# Patient Record
Sex: FEMALE | HISPANIC OR LATINO | URBAN - METROPOLITAN AREA
[De-identification: names, ages, dates, MRNs, and addresses within clinical notes are randomized per-mention and may not be internally consistent; named-entity substitution may affect disease eponyms.]

---

## 2022-12-12 ENCOUNTER — OFFICE (OUTPATIENT)
Dept: URBAN - METROPOLITAN AREA CLINIC 112 | Facility: CLINIC | Age: 57
Setting detail: OPHTHALMOLOGY
End: 2022-12-12
Payer: COMMERCIAL

## 2022-12-12 DIAGNOSIS — H40.1121: ICD-10-CM

## 2022-12-12 DIAGNOSIS — H04.122: ICD-10-CM

## 2022-12-12 DIAGNOSIS — H25.11: ICD-10-CM

## 2022-12-12 DIAGNOSIS — H04.121: ICD-10-CM

## 2022-12-12 DIAGNOSIS — H25.12: ICD-10-CM

## 2022-12-12 DIAGNOSIS — H04.123: ICD-10-CM

## 2022-12-12 DIAGNOSIS — H40.1111: ICD-10-CM

## 2022-12-12 PROCEDURE — 92083 EXTENDED VISUAL FIELD XM: CPT | Performed by: OPHTHALMOLOGY

## 2022-12-12 PROCEDURE — 92014 COMPRE OPH EXAM EST PT 1/>: CPT | Performed by: OPHTHALMOLOGY

## 2022-12-12 ASSESSMENT — KERATOMETRY
OD_K2POWER_DIOPTERS: 42.25
OD_AXISANGLE_DEGREES: 023
METHOD_AUTO_MANUAL: AUTO
OD_K1POWER_DIOPTERS: 41.75
OS_K1POWER_DIOPTERS: 42.00
OS_K2POWER_DIOPTERS: 42.25
OS_AXISANGLE_DEGREES: 168

## 2022-12-12 ASSESSMENT — REFRACTION_MANIFEST
OD_VA1: 20/20
OS_SPHERE: +4.25
OS_SPHERE: +2.50
OD_VA2: 20/20
OU_VA: 20/20
OS_VA2: 20/20
OD_AXIS: 0
OS_VA1: 20/20
OD_CYLINDER: PLANO
OS_ADD: +1.75
OD_AXIS: 123
OS_CYLINDER: PLANO
OS_AXIS: 107
OD_VA2: 20/20
OD_SPHERE: +2.50
OS_ADD: +1.75
OS_VA2: 20/20
OS_AXIS: 0
OD_ADD: +1.75
OD_ADD: +1.75
OS_CYLINDER: -0.25
OD_VA1: 20/25
OD_SPHERE: +4.00
OS_VA1: 20/25
OD_CYLINDER: -0.50

## 2022-12-12 ASSESSMENT — CONFRONTATIONAL VISUAL FIELD TEST (CVF)
OD_FINDINGS: FULL
OS_FINDINGS: FULL

## 2022-12-12 ASSESSMENT — REFRACTION_CURRENTRX
OS_VPRISM_DIRECTION: PROGS
OS_ADD: +1.75
OD_VPRISM_DIRECTION: PROGS
OS_ADD: +2.00
OS_CYLINDER: -0.75
OS_SPHERE: +4.50
OD_AXIS: 113
OS_AXIS: 085
OD_ADD: +2.00
OS_VPRISM_DIRECTION: PROGS
OS_AXIS: 090
OS_OVR_VA: 20/
OS_AXIS: 111
OD_OVR_VA: 20/
OD_AXIS: 103
OD_SPHERE: +3.00
OS_VPRISM_DIRECTION: PROGS
OS_SPHERE: +3.25
OD_AXIS: 121
OD_VPRISM_DIRECTION: PROGS
OD_CYLINDER: -0.50
OD_CYLINDER: -0.50
OD_ADD: +1.75
OS_CYLINDER: -0.25
OS_SPHERE: +4.50
OD_OVR_VA: 20/
OS_CYLINDER: -0.25
OD_CYLINDER: -0.25
OD_ADD: +1.75
OD_VPRISM_DIRECTION: PROGS
OD_SPHERE: +4.25
OS_ADD: +1.75
OD_SPHERE: +4.25
OD_OVR_VA: 20/

## 2022-12-12 ASSESSMENT — REFRACTION_AUTOREFRACTION
OD_SPHERE: +4.00
OS_AXIS: 088
OD_AXIS: 116
OD_CYLINDER: -0.50
OS_SPHERE: +4.75
OS_CYLINDER: -0.50

## 2022-12-12 ASSESSMENT — TONOMETRY
OS_IOP_MMHG: 18
OD_IOP_MMHG: 18

## 2022-12-12 ASSESSMENT — SPHEQUIV_DERIVED
OD_SPHEQUIV: 3.75
OS_SPHEQUIV: 4.125
OS_SPHEQUIV: 4.5
OD_SPHEQUIV: 3.75

## 2022-12-12 ASSESSMENT — AXIALLENGTH_DERIVED
OS_AL: 22.4065
OD_AL: 22.7157
OS_AL: 22.539
OD_AL: 22.7157

## 2022-12-12 ASSESSMENT — VISUAL ACUITY
OS_BCVA: 20/20
OD_BCVA: 20/20-

## 2022-12-12 ASSESSMENT — SUPERFICIAL PUNCTATE KERATITIS (SPK)
OD_SPK: 1+
OS_SPK: 1+

## 2022-12-12 ASSESSMENT — PACHYMETRY
OS_CT_UM: 613
OD_CT_UM: 635
OD_CT_CORRECTION: -6
OS_CT_CORRECTION: -5

## 2023-05-31 ENCOUNTER — OFFICE (OUTPATIENT)
Dept: URBAN - METROPOLITAN AREA CLINIC 94 | Facility: CLINIC | Age: 58
Setting detail: OPHTHALMOLOGY
End: 2023-05-31
Payer: COMMERCIAL

## 2023-05-31 DIAGNOSIS — H40.1111: ICD-10-CM

## 2023-05-31 DIAGNOSIS — H25.11: ICD-10-CM

## 2023-05-31 DIAGNOSIS — H04.123: ICD-10-CM

## 2023-05-31 DIAGNOSIS — H40.1121: ICD-10-CM

## 2023-05-31 DIAGNOSIS — H25.12: ICD-10-CM

## 2023-05-31 PROCEDURE — 92250 FUNDUS PHOTOGRAPHY W/I&R: CPT | Performed by: OPHTHALMOLOGY

## 2023-05-31 PROCEDURE — 92014 COMPRE OPH EXAM EST PT 1/>: CPT | Performed by: OPHTHALMOLOGY

## 2023-05-31 ASSESSMENT — PACHYMETRY
OD_CT_UM: 635
OD_CT_CORRECTION: -6
OS_CT_UM: 613
OS_CT_CORRECTION: -5

## 2023-05-31 ASSESSMENT — REFRACTION_MANIFEST
OD_VA2: 20/20
OS_VA2: 20/20
OD_AXIS: 123
OS_ADD: +1.75
OD_ADD: +1.75
OS_AXIS: 0
OD_ADD: +1.75
OS_CYLINDER: PLANO
OS_VA1: 20/20
OS_ADD: +1.75
OD_CYLINDER: PLANO
OD_VA1: 20/20
OD_VA2: 20/20
OD_SPHERE: +2.50
OD_AXIS: 0
OS_SPHERE: +2.50
OD_SPHERE: +4.00
OD_CYLINDER: -0.50
OS_VA1: 20/25
OS_CYLINDER: -0.25
OS_SPHERE: +4.25
OU_VA: 20/20
OD_VA1: 20/25
OS_VA2: 20/20
OS_AXIS: 107

## 2023-05-31 ASSESSMENT — REFRACTION_CURRENTRX
OS_OVR_VA: 20/
OD_SPHERE: +3.00
OS_ADD: +1.75
OS_ADD: +1.75
OD_VPRISM_DIRECTION: PROGS
OS_CYLINDER: -0.75
OD_CYLINDER: -0.50
OD_OVR_VA: 20/
OD_ADD: +1.75
OD_SPHERE: +4.25
OS_CYLINDER: -0.25
OS_OVR_VA: 20/
OD_ADD: +2.00
OS_CYLINDER: -0.25
OS_OVR_VA: 20/
OD_VPRISM_DIRECTION: PROGS
OS_AXIS: 085
OS_SPHERE: +4.50
OD_AXIS: 103
OD_AXIS: 113
OD_VPRISM_DIRECTION: PROGS
OS_AXIS: 111
OD_OVR_VA: 20/
OS_SPHERE: +4.50
OS_SPHERE: +3.25
OD_OVR_VA: 20/
OS_VPRISM_DIRECTION: PROGS
OD_AXIS: 121
OS_VPRISM_DIRECTION: PROGS
OS_AXIS: 090
OD_SPHERE: +4.25
OS_ADD: +2.00
OD_ADD: +1.75
OD_CYLINDER: -0.50
OD_CYLINDER: -0.25
OS_VPRISM_DIRECTION: PROGS

## 2023-05-31 ASSESSMENT — REFRACTION_AUTOREFRACTION
OD_SPHERE: +4.25
OD_CYLINDER: -0.75
OS_AXIS: 087
OS_CYLINDER: -1.00
OD_AXIS: 105
OS_SPHERE: +5.25

## 2023-05-31 ASSESSMENT — SUPERFICIAL PUNCTATE KERATITIS (SPK)
OS_SPK: 1+
OD_SPK: 1+

## 2023-05-31 ASSESSMENT — TONOMETRY
OD_IOP_MMHG: 17
OS_IOP_MMHG: 16

## 2023-05-31 ASSESSMENT — CONFRONTATIONAL VISUAL FIELD TEST (CVF)
OD_FINDINGS: FULL
OS_FINDINGS: FULL

## 2023-05-31 ASSESSMENT — SPHEQUIV_DERIVED
OS_SPHEQUIV: 4.75
OS_SPHEQUIV: 4.125
OD_SPHEQUIV: 3.75
OD_SPHEQUIV: 3.875

## 2023-05-31 ASSESSMENT — VISUAL ACUITY
OS_BCVA: 20/25
OD_BCVA: 20/25+1

## 2023-10-03 ENCOUNTER — OFFICE (OUTPATIENT)
Dept: URBAN - METROPOLITAN AREA CLINIC 94 | Facility: CLINIC | Age: 58
Setting detail: OPHTHALMOLOGY
End: 2023-10-03
Payer: MEDICAID

## 2023-10-03 DIAGNOSIS — H40.1131: ICD-10-CM

## 2023-10-03 DIAGNOSIS — H04.123: ICD-10-CM

## 2023-10-03 PROCEDURE — 92133 CPTRZD OPH DX IMG PST SGM ON: CPT | Performed by: OPHTHALMOLOGY

## 2023-10-03 PROCEDURE — 92012 INTRM OPH EXAM EST PATIENT: CPT | Performed by: OPHTHALMOLOGY

## 2023-10-03 PROCEDURE — 92020 GONIOSCOPY: CPT | Performed by: OPHTHALMOLOGY

## 2023-10-03 ASSESSMENT — CONFRONTATIONAL VISUAL FIELD TEST (CVF)
OS_FINDINGS: FULL
OD_FINDINGS: FULL

## 2023-10-03 ASSESSMENT — TONOMETRY
OD_IOP_MMHG: 14
OS_IOP_MMHG: 14

## 2023-10-03 ASSESSMENT — PACHYMETRY
OS_CT_UM: 613
OD_CT_UM: 635
OS_CT_CORRECTION: -5
OD_CT_CORRECTION: -6

## 2023-10-03 ASSESSMENT — SUPERFICIAL PUNCTATE KERATITIS (SPK)
OD_SPK: 1+
OS_SPK: 1+

## 2023-10-26 ASSESSMENT — REFRACTION_CURRENTRX
OS_ADD: +2.00
OS_AXIS: 085
OS_ADD: +1.75
OD_OVR_VA: 20/
OD_CYLINDER: -0.50
OD_CYLINDER: -0.25
OD_ADD: +2.00
OD_ADD: +1.75
OS_CYLINDER: -0.25
OD_SPHERE: +4.25
OS_ADD: +1.75
OS_OVR_VA: 20/
OS_OVR_VA: 20/
OS_VPRISM_DIRECTION: PROGS
OS_CYLINDER: -0.25
OS_VPRISM_DIRECTION: PROGS
OD_VPRISM_DIRECTION: PROGS
OD_SPHERE: +4.25
OD_VPRISM_DIRECTION: PROGS
OD_AXIS: 113
OS_SPHERE: +4.50
OS_SPHERE: +4.50
OD_OVR_VA: 20/
OD_SPHERE: +4.25
OS_OVR_VA: 20/
OD_CYLINDER: -0.50
OD_VPRISM_DIRECTION: PROGS
OD_AXIS: 121
OS_AXIS: 085
OD_AXIS: 121
OD_ADD: +1.75
OD_AXIS: 103
OS_AXIS: 111
OD_CYLINDER: -0.25
OD_SPHERE: +3.00
OS_VPRISM_DIRECTION: PROGS
OD_ADD: +1.75
OD_VPRISM_DIRECTION: PROGS
OS_SPHERE: +3.25
OS_CYLINDER: -0.25
OS_SPHERE: +4.50
OD_OVR_VA: 20/
OS_VPRISM_DIRECTION: PROGS
OS_AXIS: 090
OS_ADD: +1.75
OS_CYLINDER: -0.75

## 2023-10-26 ASSESSMENT — REFRACTION_AUTOREFRACTION
OS_AXIS: 087
OD_SPHERE: +3.75
OD_CYLINDER: 0.00
OS_SPHERE: +5.00
OS_CYLINDER: -0.75
OD_AXIS: 000

## 2023-10-26 ASSESSMENT — REFRACTION_MANIFEST
OS_SPHERE: +2.50
OS_ADD: +1.75
OS_VA1: 20/20
OD_CYLINDER: PLANO
OS_CYLINDER: -0.25
OD_ADD: +1.75
OD_VA2: 20/20
OD_CYLINDER: -0.50
OS_AXIS: 0
OD_VA1: 20/20
OD_ADD: +1.75
OS_AXIS: 107
OS_VA2: 20/20
OD_SPHERE: +4.00
OD_SPHERE: +2.50
OS_VA2: 20/20
OD_VA1: 20/25
OS_CYLINDER: PLANO
OD_AXIS: 0
OD_AXIS: 123
OU_VA: 20/20
OS_ADD: +1.75
OS_VA1: 20/25
OD_VA2: 20/20
OS_SPHERE: +4.25

## 2023-10-26 ASSESSMENT — KERATOMETRY
OS_K1POWER_DIOPTERS: 42.00
OS_AXISANGLE_DEGREES: 157
METHOD_AUTO_MANUAL: AUTO
OD_AXISANGLE_DEGREES: 090
OD_K2POWER_DIOPTERS: 42.25
OD_K1POWER_DIOPTERS: 42.25
OS_K2POWER_DIOPTERS: 42.25

## 2023-10-26 ASSESSMENT — SPHEQUIV_DERIVED
OD_SPHEQUIV: 3.75
OS_SPHEQUIV: 4.125
OD_SPHEQUIV: 3.75
OS_SPHEQUIV: 4.625

## 2023-10-26 ASSESSMENT — AXIALLENGTH_DERIVED
OS_AL: 22.539
OD_AL: 22.6308
OD_AL: 22.6308
OS_AL: 22.3626

## 2023-10-26 ASSESSMENT — VISUAL ACUITY
OD_BCVA: 20/30+
OS_BCVA: 20/25

## 2024-03-31 DIAGNOSIS — M25.562 PAIN IN RIGHT KNEE: ICD-10-CM

## 2024-03-31 DIAGNOSIS — M25.561 PAIN IN RIGHT KNEE: ICD-10-CM

## 2024-03-31 PROBLEM — Z00.00 ENCOUNTER FOR PREVENTIVE HEALTH EXAMINATION: Status: ACTIVE | Noted: 2024-03-31

## 2024-04-01 ENCOUNTER — APPOINTMENT (OUTPATIENT)
Dept: ORTHOPEDIC SURGERY | Facility: CLINIC | Age: 59
End: 2024-04-01
Payer: MEDICAID

## 2024-04-01 VITALS
BODY MASS INDEX: 30.36 KG/M2 | WEIGHT: 165 LBS | DIASTOLIC BLOOD PRESSURE: 83 MMHG | SYSTOLIC BLOOD PRESSURE: 154 MMHG | HEART RATE: 86 BPM | HEIGHT: 62 IN

## 2024-04-01 PROCEDURE — 73564 X-RAY EXAM KNEE 4 OR MORE: CPT | Mod: 50

## 2024-04-01 PROCEDURE — 99204 OFFICE O/P NEW MOD 45 MIN: CPT | Mod: 25

## 2024-04-01 PROCEDURE — 20610 DRAIN/INJ JOINT/BURSA W/O US: CPT | Mod: 50

## 2024-04-01 RX ORDER — MELOXICAM 15 MG/1
15 TABLET ORAL
Qty: 30 | Refills: 0 | Status: ACTIVE | COMMUNITY
Start: 2024-04-01 | End: 1900-01-01

## 2024-04-01 NOTE — PHYSICAL EXAM
[de-identified] : GENERAL APPEARANCE: Well nourished and hydrated, pleasant, alert, and oriented x 3. Appears their stated age.  HEENT: Normocephalic, extraocular eye motion intact. Nasal septum midline. Oral cavity clear. External auditory canal clear.  RESPIRATORY: Breath sounds clear and audible in all lobes. No wheezing, No accessory muscle use. CARDIOVASCULAR: No apparent abnormalities. No lower leg edema. No varicosities. Pedal pulses are palpable. NEUROLOGIC: Sensation is normal, no muscle weakness in the upper or lower extremities. DERMATOLOGIC: No apparent skin lesions, moist, warm, no rash. SPINE: Cervical spine appears normal and moves freely; thoracic spine appears normal and moves freely; lumbosacral spine appears normal and moves freely, normal, nontender. MUSCULOSKELETAL: Hands, wrists, and elbows are normal and move freely, shoulders are normal and move freely.  Psychiatric: Oriented to person, place, and time, insight and judgement were intact and the affect was normal.  Musculoskeletal:. Left knee exam shows mild effusion, ROM is 0-1 25 degrees, no instability, no pain with Nate, medial joint line tenderness.  Right knee exam shows mild effusion, ROM is 0-120 degrees, no instability, no pain with Nate, medial joint line tenderness.  5/5 motor strength in bilateral lower extremities. Sensory: Intact in bilateral lower extremities. DTRs: Biceps, brachioradialis, triceps, patellar, ankle and plantar 2+ and symmetric bilaterally. Pulses: dorsalis pedis, posterior tibial, femoral, popliteal, and radial 2+ and symmetric bilaterally.   [de-identified] : 4 views of bilateral knees obtained the office today show no acute fracture or dislocation.  For the right knee there is severe medial joint space narrowing small osteophyte formation consistent with Kellgren-Jerad grade 3 4 changes.  For the left knee there is moderate medial joint space narrowing patellofemoral thrice osteophyte formation consistent with Kellgren-Jerad grade 2 3 changes

## 2024-04-01 NOTE — HISTORY OF PRESENT ILLNESS
[de-identified] : Patient is a 59-year-old female here today for evaluation of bilateral right worse than left knee pain is going on for 3 months.  Patient states she has pain over the medial and anterior aspects of the knee.  Hurts to go up and down stairs or rising to seated position.  Sometimes has swelling.  Takes Advil with some relief of the pain.  States she has never had a problem with her knees before.  Denies any falls or trauma.  Denies any hip pain or neurovascular compromise lower extremities

## 2024-04-01 NOTE — DISCUSSION/SUMMARY
[Surgical risks reviewed] : Surgical risks reviewed [Medication Risks Reviewed] : Medication risks reviewed [de-identified] : Patient is a 59-year-old female with moderate to severe bilateral knee osteoarthritis presenting today for initial evaluation.  She denies a try conservative treatment I think that is warranted at this time.  I gave her injection of cortisone in the bilateral knees which she tolerated well.  We discussed low impact activity and exercise.  I recommended physical therapy.  I given a prescription for meloxicam 15 mg daily as needed for pain.  I will see her back in 6 weeks for repeat evaluation.  All questions were asked and answered.  She was advised may be candidate for total knee arthroplasty in the future

## 2024-05-20 ENCOUNTER — APPOINTMENT (OUTPATIENT)
Dept: ORTHOPEDIC SURGERY | Facility: CLINIC | Age: 59
End: 2024-05-20
Payer: MEDICAID

## 2024-05-20 VITALS
HEIGHT: 62 IN | WEIGHT: 170 LBS | DIASTOLIC BLOOD PRESSURE: 84 MMHG | BODY MASS INDEX: 31.28 KG/M2 | HEART RATE: 86 BPM | SYSTOLIC BLOOD PRESSURE: 161 MMHG

## 2024-05-20 DIAGNOSIS — M17.0 BILATERAL PRIMARY OSTEOARTHRITIS OF KNEE: ICD-10-CM

## 2024-05-20 DIAGNOSIS — Z78.9 OTHER SPECIFIED HEALTH STATUS: ICD-10-CM

## 2024-05-20 PROCEDURE — 99213 OFFICE O/P EST LOW 20 MIN: CPT

## 2024-05-20 NOTE — ADDENDUM
[FreeTextEntry1] : This note was written by Shannon Kaufman, acting as the  for Dr. Benito. This note accurately reflects the work and decisions made by Dr. Benito.

## 2024-05-20 NOTE — PHYSICAL EXAM
[Normal] : Gait: normal [de-identified] : GENERAL APPEARANCE: Well nourished and hydrated, pleasant, alert, and oriented x 3. Appears their stated age.  HEENT: Normocephalic, extraocular eye motion intact. Nasal septum midline. Oral cavity clear. External auditory canal clear.  RESPIRATORY: Breath sounds clear and audible in all lobes. No wheezing, No accessory muscle use. CARDIOVASCULAR: No apparent abnormalities. No lower leg edema. No varicosities. Pedal pulses are palpable. NEUROLOGIC: Sensation is normal, no muscle weakness in the upper or lower extremities. DERMATOLOGIC: No apparent skin lesions, moist, warm, no rash. SPINE: Cervical spine appears normal and moves freely; thoracic spine appears normal and moves freely; lumbosacral spine appears normal and moves freely, normal, nontender. MUSCULOSKELETAL: Hands, wrists, and elbows are normal and move freely, shoulders are normal and move freely.  Psychiatric: Oriented to person, place, and time, insight and judgement were intact and the affect was normal. [de-identified] : Musculoskeletal:. Left knee exam shows mild effusion, ROM is 0-1 25 degrees, no instability, no pain with Nate, medial joint line tenderness.  Right knee exam shows mild effusion, ROM is 0-120 degrees, no instability, no pain with Nate, medial joint line tenderness.  5/5 motor strength in bilateral lower extremities. Sensory: Intact in bilateral lower extremities. DTRs: Biceps, brachioradialis, triceps, patellar, ankle and plantar 2+ and symmetric bilaterally. Pulses: dorsalis pedis, posterior tibial, femoral, popliteal, and radial 2+ and symmetric bilaterally.

## 2024-05-20 NOTE — DISCUSSION/SUMMARY
[Medication Risks Reviewed] : Medication risks reviewed [de-identified] : Patient is a 59-year-old female here today for follow-up of her bilateral knee osteoarthritis.  She has had excellent response to conservative treatment.  She will continue with her at home exercises.  She continue with low impact activity and exercise.  Continue take Tylenol and NSAIDs as needed for the pain.  I will see her back on an as-needed basis for bilateral knees possible injection in the future.  All questions were asked and answered

## 2024-05-20 NOTE — HISTORY OF PRESENT ILLNESS
[Pain Location] : pain [] : right & left knee [Improving] : improving [___ mths] : [unfilled] month(s) ago [Walking] : walking [Sitting] : sitting [Lifting] : lifting [Constant] : ~He/She~ states the symptoms seem to be constant [Acetaminophen] : relieved by acetaminophen [NSAIDs] : relieved by nonsteroidal anti-inflammatory drugs [de-identified] : Patient is a 59-year-old female here today for evaluation of bilateral right worse than left knee pain is going on for 4 months.  Patient states she has pain over the medial and anterior aspects of the knee. Since last visit, she reports improvement of pain with the injection given. She states her quality of life has gone up with the decrease of pain in her bilateral knees. Takes Advil with some relief of the pain; reports not needing any since last visit.  States she has never had a problem with her knees before.  Denies any falls or trauma.  Denies any hip pain or neurovascular compromise lower extremities. Patient ambulates with no assisting devices. No other complaints. [de-identified] : going up and down stairs and rising from a seated position. [de-identified] : viscosupplementation, Advil

## 2024-05-20 NOTE — REVIEW OF SYSTEMS
[Joint Pain] : joint pain [Joint Swelling] : joint swelling [Negative] : Heme/Lymph [FreeTextEntry9] : Bilateral knee pain

## 2024-05-22 ENCOUNTER — OFFICE (OUTPATIENT)
Dept: URBAN - METROPOLITAN AREA CLINIC 94 | Facility: CLINIC | Age: 59
Setting detail: OPHTHALMOLOGY
End: 2024-05-22
Payer: MEDICAID

## 2024-05-22 DIAGNOSIS — H40.1121: ICD-10-CM

## 2024-05-22 PROCEDURE — 92083 EXTENDED VISUAL FIELD XM: CPT | Performed by: OPHTHALMOLOGY

## 2024-05-22 PROCEDURE — 92014 COMPRE OPH EXAM EST PT 1/>: CPT | Performed by: OPHTHALMOLOGY

## 2024-05-22 PROCEDURE — 92250 FUNDUS PHOTOGRAPHY W/I&R: CPT | Performed by: OPHTHALMOLOGY

## 2024-05-22 ASSESSMENT — CONFRONTATIONAL VISUAL FIELD TEST (CVF)
OD_FINDINGS: FULL
OS_FINDINGS: FULL

## 2024-07-24 ENCOUNTER — APPOINTMENT (OUTPATIENT)
Dept: ORTHOPEDIC SURGERY | Facility: CLINIC | Age: 59
End: 2024-07-24
Payer: MEDICAID

## 2024-07-24 VITALS
DIASTOLIC BLOOD PRESSURE: 89 MMHG | HEIGHT: 62 IN | HEART RATE: 81 BPM | SYSTOLIC BLOOD PRESSURE: 147 MMHG | WEIGHT: 166 LBS | BODY MASS INDEX: 30.55 KG/M2

## 2024-07-24 DIAGNOSIS — M17.0 BILATERAL PRIMARY OSTEOARTHRITIS OF KNEE: ICD-10-CM

## 2024-07-24 PROCEDURE — 99214 OFFICE O/P EST MOD 30 MIN: CPT | Mod: 25

## 2024-07-24 PROCEDURE — 20610 DRAIN/INJ JOINT/BURSA W/O US: CPT | Mod: 50

## 2024-07-24 NOTE — PHYSICAL EXAM
[de-identified] :  Left knee exam shows mild effusion, ROM is 0-1 25 degrees, no instability, no pain with Nate, medial joint line tenderness. Right knee exam shows mild effusion, ROM is 0-120 degrees, no instability, no pain with Nate, medial joint line tenderness. 5/5 motor strength in bilateral lower extremities. Sensory: Intact in bilateral lower extremities. DTRs: Biceps, brachioradialis, triceps, patellar, ankle and plantar 2+ and symmetric bilaterally. Pulses: dorsalis pedis, posterior tibial, femoral, popliteal, and radial 2+ and symmetric bilaterally.

## 2024-07-24 NOTE — REVIEW OF SYSTEMS
[Joint Pain] : joint pain [Joint Stiffness] : joint stiffness [Joint Swelling] : joint swelling [FreeTextEntry9] : Bilateral knee

## 2024-07-24 NOTE — DISCUSSION/SUMMARY
[Medication Risks Reviewed] : Medication risks reviewed [Surgical risks reviewed] : Surgical risks reviewed [de-identified] : 59-year-old female presents to the office for follow-up of moderate to severe bilateral knee osteoarthritis.  We had a discussion regarding surgical versus conservative treatment options, the patient has had good result with conservative therapy and would like to continue with that today.  I have given her injection of cortisone to the bilateral knees which she tolerated well.  She may continue her at home exercise regimen focusing on low impact activity and exercise.  She may continue to take Tylenol and NSAIDs as needed for any pain.  She should follow-up on an as-needed basis for her bilateral knees. All questions were addressed, patient in agreement.  She was advised she may be a candidate for total knee arthroplasty in the future.

## 2024-07-24 NOTE — PROCEDURE
[de-identified] : I injected the left knee. I discussed at length with the patient the planned steroid and lidocaine injection. The risks, benefits, convalescence and alternatives were reviewed. The possible side effects discussed included but were not limited to: pain, swelling, heat, bleeding, and redness. Symptoms are generally mild but if they are extensive then contact the office. Giving pain relievers by mouth such as NSAIDs or Tylenol can generally treat the reactions to steroid and lidocaine. Rare cases of infection have been noted. Rash, hives and itching may occur post injection. If you have muscle pain or cramps, flushing and or swelling of the face, rapid heart beat, nausea, dizziness, fever, chills, headache, difficulty breathing, swelling in the arms or legs, or have a prickly feeling of your skin, contact a health care provider immediately. Following this discussion, the knee was prepped with Alcohol and under sterile condition the 80 mg Depo-Medrol and 6 cc Lidocaine injection was performed with a 20 gauge needle through a superolateral injection site. The needle was introduced into the joint, aspiration was performed to ensure intra-articular placement and the medication was injected. Upon withdrawal of the needle the site was cleaned with alcohol and a band aid applied. The patient tolerated the injection well and there were no adverse effects. Post injection instructions included no strenuous activity for 24 hours, cryotherapy and if there are any adverse effects to contact the office.  I injected the right knee. I discussed at length with the patient the planned steroid and lidocaine injection. The risks, benefits, convalescence and alternatives were reviewed. The possible side effects discussed included but were not limited to: pain, swelling, heat, bleeding, and redness. Symptoms are generally mild but if they are extensive then contact the office. Giving pain relievers by mouth such as NSAIDs or Tylenol can generally treat the reactions to steroid and lidocaine. Rare cases of infection have been noted. Rash, hives and itching may occur post injection. If you have muscle pain or cramps, flushing and or swelling of the face, rapid heart beat, nausea, dizziness, fever, chills, headache, difficulty breathing, swelling in the arms or legs, or have a prickly feeling of your skin, contact a health care provider immediately. Following this discussion, the knee was prepped with Alcohol and under sterile condition the 80 mg Depo-Medrol and 6 cc Lidocaine injection was performed with a 20 gauge needle through a superolateral injection site. The needle was introduced into the joint, aspiration was performed to ensure intra-articular placement and the medication was injected. Upon withdrawal of the needle the site was cleaned with alcohol and a band aid applied. The patient tolerated the injection well and there were no adverse effects. Post injection instructions included no strenuous activity for 24 hours, cryotherapy and if there are any adverse effects to contact the office.

## 2024-07-24 NOTE — HISTORY OF PRESENT ILLNESS
[de-identified] : 59-year-old female presents to the office for follow-up of bilateral knee pain.  Patient received a cortisone injection in April which provided excellent resolution of her symptoms.  She states that today her right knee is worse than the left.  States that her pain has gradually began to return, located over the anterior medial aspect of the knee, radiates down the shin.  Exacerbated by rising from a seated position, ambulating, navigating stairs and getting in and out of the car.  Takes Tylenol with some relief.  Performs at home exercises regularly and states that her job is very physical in nature.  Denies any recent injuries falls or trauma, use of assistive device with ambulation, radiation of pain to the hip or proximal, numbness or tingling lower extremity, locking catching or buckling the knee, significant changes to her medical history since last visit.

## 2024-07-29 ENCOUNTER — APPOINTMENT (OUTPATIENT)
Dept: ORTHOPEDIC SURGERY | Facility: CLINIC | Age: 59
End: 2024-07-29

## 2025-01-14 ENCOUNTER — APPOINTMENT (OUTPATIENT)
Dept: ORTHOPEDIC SURGERY | Facility: CLINIC | Age: 60
End: 2025-01-14

## 2025-01-27 ENCOUNTER — APPOINTMENT (OUTPATIENT)
Dept: ORTHOPEDIC SURGERY | Facility: CLINIC | Age: 60
End: 2025-01-27
Payer: COMMERCIAL

## 2025-01-27 VITALS
HEIGHT: 62 IN | SYSTOLIC BLOOD PRESSURE: 141 MMHG | WEIGHT: 166 LBS | BODY MASS INDEX: 30.55 KG/M2 | DIASTOLIC BLOOD PRESSURE: 86 MMHG

## 2025-01-27 DIAGNOSIS — M17.0 BILATERAL PRIMARY OSTEOARTHRITIS OF KNEE: ICD-10-CM

## 2025-01-27 PROCEDURE — 99214 OFFICE O/P EST MOD 30 MIN: CPT | Mod: 25

## 2025-01-27 PROCEDURE — 20610 DRAIN/INJ JOINT/BURSA W/O US: CPT | Mod: 50

## 2025-01-28 ENCOUNTER — APPOINTMENT (OUTPATIENT)
Dept: ORTHOPEDIC SURGERY | Facility: CLINIC | Age: 60
End: 2025-01-28
Payer: COMMERCIAL

## 2025-01-28 VITALS — BODY MASS INDEX: 30.36 KG/M2 | WEIGHT: 165 LBS | HEIGHT: 62 IN

## 2025-01-28 DIAGNOSIS — M25.531 PAIN IN RIGHT WRIST: ICD-10-CM

## 2025-01-28 DIAGNOSIS — M25.532 PAIN IN RIGHT WRIST: ICD-10-CM

## 2025-01-28 DIAGNOSIS — M65.341 TRIGGER FINGER, RIGHT RING FINGER: ICD-10-CM

## 2025-01-28 DIAGNOSIS — M79.641 PAIN IN RIGHT HAND: ICD-10-CM

## 2025-01-28 DIAGNOSIS — M79.642 PAIN IN RIGHT HAND: ICD-10-CM

## 2025-01-28 DIAGNOSIS — G56.03 CARPAL TUNNEL SYNDROM,BILATERAL UPPER LIMBS: ICD-10-CM

## 2025-01-28 PROCEDURE — 20526 THER INJECTION CARP TUNNEL: CPT | Mod: 50,59

## 2025-01-28 PROCEDURE — 99215 OFFICE O/P EST HI 40 MIN: CPT | Mod: 25

## 2025-01-28 PROCEDURE — 20550 NJX 1 TENDON SHEATH/LIGAMENT: CPT | Mod: RT

## 2025-01-28 PROCEDURE — 73110 X-RAY EXAM OF WRIST: CPT | Mod: 50

## 2025-01-30 ENCOUNTER — APPOINTMENT (OUTPATIENT)
Dept: OBGYN | Facility: CLINIC | Age: 60
End: 2025-01-30
Payer: COMMERCIAL

## 2025-01-30 VITALS
HEIGHT: 62 IN | WEIGHT: 165 LBS | DIASTOLIC BLOOD PRESSURE: 78 MMHG | SYSTOLIC BLOOD PRESSURE: 159 MMHG | BODY MASS INDEX: 30.36 KG/M2

## 2025-01-30 DIAGNOSIS — L98.9 DISORDER OF THE SKIN AND SUBCUTANEOUS TISSUE, UNSPECIFIED: ICD-10-CM

## 2025-01-30 DIAGNOSIS — A63.0 ANOGENITAL (VENEREAL) WARTS: ICD-10-CM

## 2025-01-30 DIAGNOSIS — R92.333 MAMMOGRAPHIC HETEROGENEOUS DENSITY, BILATERAL BREASTS: ICD-10-CM

## 2025-01-30 DIAGNOSIS — Z01.419 ENCOUNTER FOR GYNECOLOGICAL EXAMINATION (GENERAL) (ROUTINE) W/OUT ABNORMAL FINDINGS: ICD-10-CM

## 2025-01-30 DIAGNOSIS — Z12.31 ENCOUNTER FOR SCREENING MAMMOGRAM FOR MALIGNANT NEOPLASM OF BREAST: ICD-10-CM

## 2025-01-30 PROCEDURE — 99386 PREV VISIT NEW AGE 40-64: CPT

## 2025-01-30 RX ORDER — ESTRADIOL 10 UG/1
10 TABLET VAGINAL
Qty: 35 | Refills: 3 | Status: ACTIVE | COMMUNITY
Start: 2025-01-30 | End: 1900-01-01

## 2025-01-30 RX ORDER — IMIQUIMOD 50 MG/G
5 CREAM TOPICAL DAILY
Qty: 1 | Refills: 0 | Status: ACTIVE | COMMUNITY
Start: 2025-01-30 | End: 1900-01-01

## 2025-02-03 LAB — CYTOLOGY CVX/VAG DOC THIN PREP: NORMAL

## 2025-04-04 ENCOUNTER — NON-APPOINTMENT (OUTPATIENT)
Age: 60
End: 2025-04-04

## 2025-04-08 ENCOUNTER — APPOINTMENT (OUTPATIENT)
Dept: ORTHOPEDIC SURGERY | Facility: CLINIC | Age: 60
End: 2025-04-08

## 2025-04-08 DIAGNOSIS — M65.341 TRIGGER FINGER, RIGHT RING FINGER: ICD-10-CM

## 2025-04-08 DIAGNOSIS — M25.642 STIFFNESS OF RIGHT HAND, NOT ELSEWHERE CLASSIFIED: ICD-10-CM

## 2025-04-08 DIAGNOSIS — G56.03 CARPAL TUNNEL SYNDROM,BILATERAL UPPER LIMBS: ICD-10-CM

## 2025-04-08 DIAGNOSIS — M25.641 STIFFNESS OF RIGHT HAND, NOT ELSEWHERE CLASSIFIED: ICD-10-CM

## 2025-04-08 PROCEDURE — 20550 NJX 1 TENDON SHEATH/LIGAMENT: CPT | Mod: RT

## 2025-04-08 PROCEDURE — 20526 THER INJECTION CARP TUNNEL: CPT | Mod: 50,59

## 2025-04-08 PROCEDURE — 99214 OFFICE O/P EST MOD 30 MIN: CPT | Mod: 25

## 2025-04-28 ENCOUNTER — APPOINTMENT (OUTPATIENT)
Dept: ORTHOPEDIC SURGERY | Facility: CLINIC | Age: 60
End: 2025-04-28
Payer: COMMERCIAL

## 2025-04-28 VITALS
WEIGHT: 165 LBS | SYSTOLIC BLOOD PRESSURE: 137 MMHG | BODY MASS INDEX: 30.36 KG/M2 | HEIGHT: 62 IN | DIASTOLIC BLOOD PRESSURE: 81 MMHG

## 2025-04-28 DIAGNOSIS — M17.0 BILATERAL PRIMARY OSTEOARTHRITIS OF KNEE: ICD-10-CM

## 2025-04-28 PROCEDURE — G2211 COMPLEX E/M VISIT ADD ON: CPT | Mod: NC

## 2025-04-28 PROCEDURE — 99214 OFFICE O/P EST MOD 30 MIN: CPT

## 2025-06-10 ENCOUNTER — APPOINTMENT (OUTPATIENT)
Dept: ORTHOPEDIC SURGERY | Facility: CLINIC | Age: 60
End: 2025-06-10

## 2025-06-10 PROCEDURE — 20550 NJX 1 TENDON SHEATH/LIGAMENT: CPT | Mod: RT,59

## 2025-06-10 PROCEDURE — 99214 OFFICE O/P EST MOD 30 MIN: CPT | Mod: 25

## 2025-06-10 PROCEDURE — 20526 THER INJECTION CARP TUNNEL: CPT | Mod: 50,59

## 2025-06-17 ENCOUNTER — NON-APPOINTMENT (OUTPATIENT)
Age: 60
End: 2025-06-17

## 2025-08-19 ENCOUNTER — APPOINTMENT (OUTPATIENT)
Dept: ORTHOPEDIC SURGERY | Facility: CLINIC | Age: 60
End: 2025-08-19
Payer: COMMERCIAL

## 2025-08-19 VITALS — BODY MASS INDEX: 28.88 KG/M2 | WEIGHT: 163 LBS | HEIGHT: 63 IN

## 2025-08-19 DIAGNOSIS — M25.641 STIFFNESS OF RIGHT HAND, NOT ELSEWHERE CLASSIFIED: ICD-10-CM

## 2025-08-19 DIAGNOSIS — M65.932 UNSPECIFIED SYNOVITIS AND TENOSYNOVITIS, RIGHT FOREARM: ICD-10-CM

## 2025-08-19 DIAGNOSIS — M65.931 UNSPECIFIED SYNOVITIS AND TENOSYNOVITIS, RIGHT FOREARM: ICD-10-CM

## 2025-08-19 DIAGNOSIS — M65.331 TRIGGER FINGER, RIGHT MIDDLE FINGER: ICD-10-CM

## 2025-08-19 DIAGNOSIS — G56.03 CARPAL TUNNEL SYNDROM,BILATERAL UPPER LIMBS: ICD-10-CM

## 2025-08-19 DIAGNOSIS — M25.642 STIFFNESS OF RIGHT HAND, NOT ELSEWHERE CLASSIFIED: ICD-10-CM

## 2025-08-19 PROCEDURE — 20526 THER INJECTION CARP TUNNEL: CPT | Mod: LT

## 2025-08-19 PROCEDURE — 99214 OFFICE O/P EST MOD 30 MIN: CPT | Mod: 25

## 2025-08-23 ENCOUNTER — OUTPATIENT (OUTPATIENT)
Dept: OUTPATIENT SERVICES | Facility: HOSPITAL | Age: 60
LOS: 1 days | End: 2025-08-23
Payer: COMMERCIAL

## 2025-08-23 DIAGNOSIS — Z01.818 ENCOUNTER FOR OTHER PREPROCEDURAL EXAMINATION: ICD-10-CM

## 2025-08-23 LAB
A1C WITH ESTIMATED AVERAGE GLUCOSE RESULT: 6.2 % — HIGH (ref 4–5.6)
ANION GAP SERPL CALC-SCNC: 10 MMOL/L — SIGNIFICANT CHANGE UP (ref 5–17)
BASOPHILS # BLD AUTO: 0.04 K/UL — SIGNIFICANT CHANGE UP (ref 0–0.2)
BASOPHILS NFR BLD AUTO: 0.7 % — SIGNIFICANT CHANGE UP (ref 0–2)
BUN SERPL-MCNC: 12.2 MG/DL — SIGNIFICANT CHANGE UP (ref 8–20)
CALCIUM SERPL-MCNC: 10.3 MG/DL — SIGNIFICANT CHANGE UP (ref 8.4–10.5)
CHLORIDE SERPL-SCNC: 99 MMOL/L — SIGNIFICANT CHANGE UP (ref 96–108)
CO2 SERPL-SCNC: 28 MMOL/L — SIGNIFICANT CHANGE UP (ref 22–29)
CREAT SERPL-MCNC: 0.66 MG/DL — SIGNIFICANT CHANGE UP (ref 0.5–1.3)
EGFR: 100 ML/MIN/1.73M2 — SIGNIFICANT CHANGE UP
EGFR: 100 ML/MIN/1.73M2 — SIGNIFICANT CHANGE UP
EOSINOPHIL # BLD AUTO: 0.11 K/UL — SIGNIFICANT CHANGE UP (ref 0–0.5)
EOSINOPHIL NFR BLD AUTO: 2 % — SIGNIFICANT CHANGE UP (ref 0–6)
ESTIMATED AVERAGE GLUCOSE: 131 MG/DL — HIGH (ref 68–114)
GLUCOSE SERPL-MCNC: 113 MG/DL — HIGH (ref 70–99)
HCT VFR BLD CALC: 40.5 % — SIGNIFICANT CHANGE UP (ref 34.5–45)
HGB BLD-MCNC: 12.7 G/DL — SIGNIFICANT CHANGE UP (ref 11.5–15.5)
IMM GRANULOCYTES # BLD AUTO: 0 K/UL — SIGNIFICANT CHANGE UP (ref 0–0.07)
IMM GRANULOCYTES NFR BLD AUTO: 0 % — SIGNIFICANT CHANGE UP (ref 0–0.9)
LYMPHOCYTES # BLD AUTO: 2.24 K/UL — SIGNIFICANT CHANGE UP (ref 1–3.3)
LYMPHOCYTES NFR BLD AUTO: 41.3 % — SIGNIFICANT CHANGE UP (ref 13–44)
MCHC RBC-ENTMCNC: 27.5 PG — SIGNIFICANT CHANGE UP (ref 27–34)
MCHC RBC-ENTMCNC: 31.4 G/DL — LOW (ref 32–36)
MCV RBC AUTO: 87.7 FL — SIGNIFICANT CHANGE UP (ref 80–100)
MONOCYTES # BLD AUTO: 0.4 K/UL — SIGNIFICANT CHANGE UP (ref 0–0.9)
MONOCYTES NFR BLD AUTO: 7.4 % — SIGNIFICANT CHANGE UP (ref 2–14)
NEUTROPHILS # BLD AUTO: 2.64 K/UL — SIGNIFICANT CHANGE UP (ref 1.8–7.4)
NEUTROPHILS NFR BLD AUTO: 48.6 % — SIGNIFICANT CHANGE UP (ref 43–77)
NRBC # BLD AUTO: 0 K/UL — SIGNIFICANT CHANGE UP (ref 0–0)
NRBC # FLD: 0 K/UL — SIGNIFICANT CHANGE UP (ref 0–0)
NRBC BLD AUTO-RTO: 0 /100 WBCS — SIGNIFICANT CHANGE UP (ref 0–0)
PLATELET # BLD AUTO: 250 K/UL — SIGNIFICANT CHANGE UP (ref 150–400)
PMV BLD: 10.9 FL — SIGNIFICANT CHANGE UP (ref 7–13)
POTASSIUM SERPL-MCNC: 5.3 MMOL/L — SIGNIFICANT CHANGE UP (ref 3.5–5.3)
POTASSIUM SERPL-SCNC: 5.3 MMOL/L — SIGNIFICANT CHANGE UP (ref 3.5–5.3)
RBC # BLD: 4.62 M/UL — SIGNIFICANT CHANGE UP (ref 3.8–5.2)
RBC # FLD: 13.3 % — SIGNIFICANT CHANGE UP (ref 10.3–14.5)
SODIUM SERPL-SCNC: 137 MMOL/L — SIGNIFICANT CHANGE UP (ref 135–145)
WBC # BLD: 5.43 K/UL — SIGNIFICANT CHANGE UP (ref 3.8–10.5)
WBC # FLD AUTO: 5.43 K/UL — SIGNIFICANT CHANGE UP (ref 3.8–10.5)

## 2025-08-23 PROCEDURE — 93010 ELECTROCARDIOGRAM REPORT: CPT

## 2025-08-23 PROCEDURE — G0463: CPT

## 2025-08-23 PROCEDURE — 85025 COMPLETE CBC W/AUTO DIFF WBC: CPT

## 2025-08-23 PROCEDURE — 80048 BASIC METABOLIC PNL TOTAL CA: CPT

## 2025-08-23 PROCEDURE — 36415 COLL VENOUS BLD VENIPUNCTURE: CPT

## 2025-08-23 PROCEDURE — 83036 HEMOGLOBIN GLYCOSYLATED A1C: CPT

## 2025-09-05 RX ORDER — MELOXICAM 15 MG/1
15 TABLET ORAL DAILY
Qty: 14 | Refills: 0 | Status: ACTIVE | COMMUNITY
Start: 2025-09-05 | End: 1900-01-01

## 2025-09-08 ENCOUNTER — RESULT REVIEW (OUTPATIENT)
Age: 60
End: 2025-09-08

## 2025-09-08 ENCOUNTER — APPOINTMENT (OUTPATIENT)
Dept: ORTHOPEDIC SURGERY | Facility: AMBULATORY SURGERY CENTER | Age: 60
End: 2025-09-08

## 2025-09-17 ENCOUNTER — APPOINTMENT (OUTPATIENT)
Dept: ORTHOPEDIC SURGERY | Facility: CLINIC | Age: 60
End: 2025-09-17
Payer: COMMERCIAL

## 2025-09-17 DIAGNOSIS — M25.641 STIFFNESS OF RIGHT HAND, NOT ELSEWHERE CLASSIFIED: ICD-10-CM

## 2025-09-17 DIAGNOSIS — M65.331 TRIGGER FINGER, RIGHT MIDDLE FINGER: ICD-10-CM

## 2025-09-17 DIAGNOSIS — G56.01 CARPAL TUNNEL SYNDROME, RIGHT UPPER LIMB: ICD-10-CM

## 2025-09-17 DIAGNOSIS — M25.642 STIFFNESS OF RIGHT HAND, NOT ELSEWHERE CLASSIFIED: ICD-10-CM

## 2025-09-17 PROCEDURE — 99024 POSTOP FOLLOW-UP VISIT: CPT

## 2025-09-17 RX ORDER — SULFAMETHOXAZOLE AND TRIMETHOPRIM 800; 160 MG/1; MG/1
800-160 TABLET ORAL TWICE DAILY
Qty: 14 | Refills: 0 | Status: ACTIVE | COMMUNITY
Start: 2025-09-17 | End: 1900-01-01